# Patient Record
Sex: FEMALE | Race: WHITE | NOT HISPANIC OR LATINO | URBAN - METROPOLITAN AREA
[De-identification: names, ages, dates, MRNs, and addresses within clinical notes are randomized per-mention and may not be internally consistent; named-entity substitution may affect disease eponyms.]

---

## 2018-12-18 NOTE — PATIENT DISCUSSION
"""D/w patient that the pictures for insurance will be borderline.  d/w patient can do testing v/f ""

## 2022-11-01 ENCOUNTER — NEW PATIENT (OUTPATIENT)
Dept: URBAN - METROPOLITAN AREA CLINIC 14 | Facility: CLINIC | Age: 67
End: 2022-11-01

## 2022-11-01 DIAGNOSIS — H33.302: ICD-10-CM

## 2022-11-01 DIAGNOSIS — H52.4: ICD-10-CM

## 2022-11-01 DIAGNOSIS — H25.13: ICD-10-CM

## 2022-11-01 PROCEDURE — 92015 DETERMINE REFRACTIVE STATE: CPT

## 2022-11-01 PROCEDURE — 92004 COMPRE OPH EXAM NEW PT 1/>: CPT

## 2022-11-01 PROCEDURE — 92310C CONTACT LENS 75

## 2022-11-01 ASSESSMENT — VISUAL ACUITY
OS_CC: 20/100
OS_BCVA: 20/25
OD_BCVA: 20/25
OD_CC: 20/40
OS_CC: J1+

## 2022-11-01 ASSESSMENT — KERATOMETRY
OD_AXISANGLE_DEGREES: 47
OD_K2POWER_DIOPTERS: 44.75
OD_K1POWER_DIOPTERS: 44.50
OS_AXISANGLE2_DEGREES: 13
OS_K1POWER_DIOPTERS: 45.00
OD_AXISANGLE2_DEGREES: 137
OS_AXISANGLE_DEGREES: 103
OS_K2POWER_DIOPTERS: 45.25

## 2022-11-01 ASSESSMENT — TONOMETRY
OS_IOP_MMHG: 15
OD_IOP_MMHG: 14

## 2023-04-03 ENCOUNTER — ESTABLISHED PATIENT (OUTPATIENT)
Dept: URBAN - METROPOLITAN AREA CLINIC 14 | Facility: CLINIC | Age: 68
End: 2023-04-03

## 2023-04-03 DIAGNOSIS — H04.123: ICD-10-CM

## 2023-04-03 PROCEDURE — 99213 OFFICE O/P EST LOW 20 MIN: CPT

## 2023-04-03 ASSESSMENT — TONOMETRY
OD_IOP_MMHG: 13
OS_IOP_MMHG: 15

## 2023-04-03 ASSESSMENT — VISUAL ACUITY
OD_CC: 20/20-1
OS_CC: 20/60

## 2023-11-13 ENCOUNTER — EMERGENCY VISIT (OUTPATIENT)
Dept: URBAN - METROPOLITAN AREA CLINIC 16 | Facility: CLINIC | Age: 68
End: 2023-11-13

## 2023-11-13 DIAGNOSIS — H16.001: ICD-10-CM

## 2023-11-13 PROCEDURE — 99214 OFFICE O/P EST MOD 30 MIN: CPT

## 2023-11-13 ASSESSMENT — VISUAL ACUITY
OS_CC: 20/25
OD_CC: 20/20-1
OS_CC: 20/40

## 2023-11-13 ASSESSMENT — TONOMETRY
OS_IOP_MMHG: 15
OD_IOP_MMHG: 18

## 2023-11-15 ENCOUNTER — FOLLOW UP (OUTPATIENT)
Dept: URBAN - METROPOLITAN AREA CLINIC 16 | Facility: CLINIC | Age: 68
End: 2023-11-15

## 2023-11-15 DIAGNOSIS — H16.001: ICD-10-CM

## 2023-11-15 PROCEDURE — 99213 OFFICE O/P EST LOW 20 MIN: CPT

## 2023-11-15 ASSESSMENT — VISUAL ACUITY
OS_CC: 20/40
OS_SC: J3
OD_CC: 20/20-1

## 2025-04-25 ENCOUNTER — COMPREHENSIVE EXAM (OUTPATIENT)
Age: 70
End: 2025-04-25

## 2025-04-25 DIAGNOSIS — H52.4: ICD-10-CM

## 2025-04-25 DIAGNOSIS — H25.13: ICD-10-CM

## 2025-04-25 DIAGNOSIS — H04.123: ICD-10-CM

## 2025-04-25 PROCEDURE — 92015 DETERMINE REFRACTIVE STATE: CPT

## 2025-04-25 PROCEDURE — 92014 COMPRE OPH EXAM EST PT 1/>: CPT

## 2025-04-25 PROCEDURE — 92310-3 LEVEL 3 SOFT LENS UPDATE

## 2025-06-10 ENCOUNTER — PREP FOR PROCEDURE (OUTPATIENT)
Dept: ADMINISTRATIVE | Age: 70
End: 2025-06-10

## 2025-06-12 PROBLEM — J34.2 DEVIATED NASAL SEPTUM: Status: ACTIVE | Noted: 2025-06-12

## 2025-06-12 RX ORDER — SODIUM CHLORIDE 0.9 % (FLUSH) 0.9 %
5-40 SYRINGE (ML) INJECTION EVERY 12 HOURS SCHEDULED
Status: CANCELLED | OUTPATIENT
Start: 2025-06-12

## 2025-06-12 RX ORDER — SODIUM CHLORIDE 9 MG/ML
INJECTION, SOLUTION INTRAVENOUS PRN
Status: CANCELLED | OUTPATIENT
Start: 2025-06-12

## 2025-06-12 RX ORDER — SODIUM CHLORIDE 0.9 % (FLUSH) 0.9 %
5-40 SYRINGE (ML) INJECTION PRN
Status: CANCELLED | OUTPATIENT
Start: 2025-06-12